# Patient Record
Sex: FEMALE | Race: OTHER | NOT HISPANIC OR LATINO | URBAN - METROPOLITAN AREA
[De-identification: names, ages, dates, MRNs, and addresses within clinical notes are randomized per-mention and may not be internally consistent; named-entity substitution may affect disease eponyms.]

---

## 2018-07-13 ENCOUNTER — EMERGENCY (EMERGENCY)
Facility: HOSPITAL | Age: 63
LOS: 1 days | Discharge: HOME | End: 2018-07-13
Attending: EMERGENCY MEDICINE | Admitting: EMERGENCY MEDICINE
Payer: COMMERCIAL

## 2018-07-13 VITALS — HEART RATE: 92 BPM | RESPIRATION RATE: 16 BRPM

## 2018-07-13 VITALS
RESPIRATION RATE: 16 BRPM | SYSTOLIC BLOOD PRESSURE: 83 MMHG | HEART RATE: 134 BPM | OXYGEN SATURATION: 100 % | DIASTOLIC BLOOD PRESSURE: 54 MMHG

## 2018-07-13 DIAGNOSIS — Z91.018 ALLERGY TO OTHER FOODS: ICD-10-CM

## 2018-07-13 DIAGNOSIS — I95.9 HYPOTENSION, UNSPECIFIED: ICD-10-CM

## 2018-07-13 DIAGNOSIS — I46.9 CARDIAC ARREST, CAUSE UNSPECIFIED: ICD-10-CM

## 2018-07-13 DIAGNOSIS — R41.82 ALTERED MENTAL STATUS, UNSPECIFIED: ICD-10-CM

## 2018-07-13 DIAGNOSIS — E78.5 HYPERLIPIDEMIA, UNSPECIFIED: ICD-10-CM

## 2018-07-13 LAB
ALBUMIN SERPL ELPH-MCNC: 1.2 G/DL — LOW (ref 3.4–5)
ALP SERPL-CCNC: 63 U/L — SIGNIFICANT CHANGE UP (ref 40–120)
ALT FLD-CCNC: >1000 U/L — HIGH (ref 12–42)
ANION GAP SERPL CALC-SCNC: 23 MMOL/L — HIGH (ref 9–16)
APTT BLD: 56.3 SEC — HIGH (ref 27.5–36.5)
AST SERPL-CCNC: >1000 U/L — HIGH (ref 15–37)
BILIRUB SERPL-MCNC: 0.6 MG/DL — SIGNIFICANT CHANGE UP (ref 0.2–1.2)
BUN SERPL-MCNC: 31 MG/DL — HIGH (ref 7–23)
CALCIUM SERPL-MCNC: 9.8 MG/DL — SIGNIFICANT CHANGE UP (ref 8.5–10.5)
CHLORIDE SERPL-SCNC: 100 MMOL/L — SIGNIFICANT CHANGE UP (ref 96–108)
CO2 SERPL-SCNC: 17 MMOL/L — LOW (ref 22–31)
CREAT SERPL-MCNC: 1.38 MG/DL — HIGH (ref 0.5–1.3)
GLUCOSE SERPL-MCNC: 485 MG/DL — CRITICAL HIGH (ref 70–99)
HCT VFR BLD CALC: 30.1 % — LOW (ref 34.5–45)
HGB BLD-MCNC: 9.6 G/DL — LOW (ref 11.5–15.5)
INR BLD: 1.64 — HIGH (ref 0.88–1.16)
LACTATE SERPL-SCNC: >15 MMOL/L — CRITICAL HIGH (ref 0.4–2)
LYMPHOCYTES # BLD AUTO: 58 % — HIGH (ref 13–44)
MCHC RBC-ENTMCNC: 31 PG — SIGNIFICANT CHANGE UP (ref 27–34)
MCHC RBC-ENTMCNC: 31.9 G/DL — LOW (ref 32–36)
MCV RBC AUTO: 97.1 FL — SIGNIFICANT CHANGE UP (ref 80–100)
MONOCYTES NFR BLD AUTO: 1 % — LOW (ref 2–14)
NEUTROPHILS NFR BLD AUTO: 22 % — LOW (ref 43–77)
PCO2 BLDV: 71 MMHG — HIGH (ref 41–51)
PH BLDV: 6.96 — CRITICAL LOW (ref 7.32–7.43)
PLATELET # BLD AUTO: 88 K/UL — LOW (ref 150–400)
PO2 BLDV: 58 MMHG — HIGH (ref 35–40)
POTASSIUM SERPL-MCNC: 6.4 MMOL/L — CRITICAL HIGH (ref 3.5–5.3)
POTASSIUM SERPL-SCNC: 6.4 MMOL/L — CRITICAL HIGH (ref 3.5–5.3)
PROT SERPL-MCNC: 3.1 G/DL — LOW (ref 6.4–8.2)
PROTHROM AB SERPL-ACNC: 18.2 SEC — HIGH (ref 9.8–12.7)
RBC # BLD: 3.1 M/UL — LOW (ref 3.8–5.2)
RBC # FLD: 16 % — SIGNIFICANT CHANGE UP (ref 10.3–16.9)
SAO2 % BLDV: 70 % — SIGNIFICANT CHANGE UP
SODIUM SERPL-SCNC: 140 MMOL/L — SIGNIFICANT CHANGE UP (ref 132–145)
TROPONIN I SERPL-MCNC: 0.05 NG/ML — SIGNIFICANT CHANGE UP (ref 0.02–0.06)
WBC # BLD: 1.2 K/UL — LOW (ref 3.8–10.5)
WBC # FLD AUTO: 1.2 K/UL — LOW (ref 3.8–10.5)

## 2018-07-13 PROCEDURE — 99291 CRITICAL CARE FIRST HOUR: CPT | Mod: 25

## 2018-07-13 PROCEDURE — 71045 X-RAY EXAM CHEST 1 VIEW: CPT | Mod: 26

## 2018-07-13 PROCEDURE — 36556 INSERT NON-TUNNEL CV CATH: CPT

## 2018-07-13 PROCEDURE — 70450 CT HEAD/BRAIN W/O DYE: CPT | Mod: 26

## 2018-07-13 PROCEDURE — 31500 INSERT EMERGENCY AIRWAY: CPT

## 2018-07-13 RX ORDER — NITROFURANTOIN MACROCRYSTAL 50 MG
0 CAPSULE ORAL
Qty: 0 | Refills: 0 | COMMUNITY

## 2018-07-13 RX ORDER — LETROZOLE 2.5 MG/1
1 TABLET, FILM COATED ORAL
Qty: 0 | Refills: 0 | COMMUNITY

## 2018-07-13 RX ORDER — PIPERACILLIN AND TAZOBACTAM 4; .5 G/20ML; G/20ML
3.38 INJECTION, POWDER, LYOPHILIZED, FOR SOLUTION INTRAVENOUS ONCE
Qty: 0 | Refills: 0 | Status: COMPLETED | OUTPATIENT
Start: 2018-07-13 | End: 2018-07-13

## 2018-07-13 RX ORDER — VANCOMYCIN HCL 1 G
1000 VIAL (EA) INTRAVENOUS ONCE
Qty: 0 | Refills: 0 | Status: COMPLETED | OUTPATIENT
Start: 2018-07-13 | End: 2018-07-13

## 2018-07-13 RX ORDER — SODIUM BICARBONATE 1 MEQ/ML
50 SYRINGE (ML) INTRAVENOUS ONCE
Qty: 0 | Refills: 0 | Status: COMPLETED | OUTPATIENT
Start: 2018-07-13 | End: 2018-07-13

## 2018-07-13 RX ORDER — RIBOCICLIB 200 MG/1
0 TABLET, FILM COATED ORAL
Qty: 0 | Refills: 0 | COMMUNITY

## 2018-07-13 RX ORDER — SODIUM CHLORIDE 9 MG/ML
3000 INJECTION INTRAMUSCULAR; INTRAVENOUS; SUBCUTANEOUS ONCE
Qty: 0 | Refills: 0 | Status: DISCONTINUED | OUTPATIENT
Start: 2018-07-13 | End: 2018-07-17

## 2018-07-13 RX ORDER — ATORVASTATIN CALCIUM 80 MG/1
1 TABLET, FILM COATED ORAL
Qty: 0 | Refills: 0 | COMMUNITY

## 2018-07-13 RX ORDER — NOREPINEPHRINE BITARTRATE/D5W 8 MG/250ML
0.1 PLASTIC BAG, INJECTION (ML) INTRAVENOUS
Qty: 8 | Refills: 0 | Status: DISCONTINUED | OUTPATIENT
Start: 2018-07-13 | End: 2018-07-17

## 2018-07-13 RX ADMIN — Medication 50 MILLIEQUIVALENT(S): at 21:33

## 2018-07-13 RX ADMIN — Medication 50 MILLIEQUIVALENT(S): at 21:28

## 2018-07-13 RX ADMIN — Medication 250 MILLIGRAM(S): at 20:37

## 2018-07-13 RX ADMIN — Medication 50 MILLIEQUIVALENT(S): at 21:25

## 2018-07-13 RX ADMIN — PIPERACILLIN AND TAZOBACTAM 200 GRAM(S): 4; .5 INJECTION, POWDER, LYOPHILIZED, FOR SOLUTION INTRAVENOUS at 20:25

## 2018-07-13 RX ADMIN — Medication 9.36 MICROGRAM(S)/KG/MIN: at 20:39

## 2018-07-13 RX ADMIN — PIPERACILLIN AND TAZOBACTAM 3.38 GRAM(S): 4; .5 INJECTION, POWDER, LYOPHILIZED, FOR SOLUTION INTRAVENOUS at 21:25

## 2018-07-13 RX ADMIN — Medication 1000 MILLIGRAM(S): at 21:57

## 2018-07-13 RX ADMIN — Medication 50 MILLIEQUIVALENT(S): at 21:30

## 2018-07-13 NOTE — ED ADULT NURSE NOTE - CHIEF COMPLAINT QUOTE
altered mental status and hypotension as per EMS, patient unresponsive on arrival, eyes open and agonal breathing

## 2018-07-13 NOTE — ED PROCEDURE NOTE - CPROC ED TRACHE INTUB DETAIL1
During intubation, applied gentle pressure to the cricoid cartilage./Patient was pre-oxygenated. An endotracheal tube (ETT) was placed through the vocal cords into the trachea.  ETT position was confirmed by auscultation of bilateral breath sounds to all lung fields. ETCO2 level was appropriate.

## 2018-07-13 NOTE — ED PROVIDER NOTE - MEDICAL DECISION MAKING DETAILS
Post-cardiac arrest.  Possible due to septic shock.  Septic w/u performed.  Abx given.  HCT perfored to r/o ICH.  Pressure support initiated.  Critical transfer to .

## 2018-07-13 NOTE — ED ADULT NURSE REASSESSMENT NOTE - NS ED NURSE REASSESS COMMENT FT1
Previous R femoral central line was removed prior to my shift and no bleeding noted at this time
Pt bleeding from old R femoral line and pressure holding at the bedside. Pt hypercritical to BI
bleeding controlled at right femoral s/p central line removal. dressing applied
strong carotid/ femoral pulses felt, no BP obtained by monitor nor manual

## 2018-07-13 NOTE — ED PROVIDER NOTE - PROGRESS NOTE DETAILS
: Dereck Wood [phone] (Cellular) [phone] (Home) : Dereck Wood [phone] (Cellular) [phone] (Home).  Discussed care.  He confirms pt is full code.  Hypercritical transfer discussed. To note pt is difficult peripheral access.  Crash line placed to R groin - stuck artery - line removed.  Pressure dressing placed.  Central line placed to L femoral vein.      To note pt bleed through pressure dressing - pressure applied by PCT.

## 2018-07-13 NOTE — ED PROVIDER NOTE - PHYSICAL EXAMINATION
VITAL SIGNS: I have reviewed nursing notes and confirm.  CONSTITUTIONAL: Thin, cachectic, unresponsive to both verbal and painful stimuli.   SKIN: Skin is warm and dry, no acute rash or trauma.   HEAD: Normocephalic; atraumatic.  EYES: PERRL, EOM intact; conjunctiva and sclera clear.  ENT: No nasal discharge; airway clear.  CARD: S1, S2 normal; +Murmur. +Tachycardia.   RESP: No wheezes, rales or rhonchi.  Intubated - equal BS bilaterally.   ABD: Normal bowel sounds; soft; non-distended; non-tender; no hepatosplenomegaly.  EXT: Thin with atrophy. No clubbing, cyanosis or edema.  NEURO: Unresponsive.

## 2018-07-13 NOTE — ED PROCEDURE NOTE - CPROC ED INFUS LINE DETAIL1
The guidewire was recovered./The catheter was placed using sterile technique./All lumen(s) aspirated and flushed without difficulty./The location was identified, and the area was draped and prepped.

## 2018-07-13 NOTE — ED PROVIDER NOTE - CRITICAL CARE PROVIDED
direct patient care (not related to procedure)/consultation with other physicians/additional history taking/documentation/interpretation of diagnostic studies/consult w/ pt's family directly relating to pts condition

## 2018-07-13 NOTE — ED ADULT NURSE NOTE - OBJECTIVE STATEMENT
as per  she has been at [facility] for 1 week and they called 911 because she was lethargic and hypotensive. as per EMS she was responsive on the ambulance and on arrival to ED she became unresponsive with shallow breathing and then agonal

## 2018-07-13 NOTE — ED PROVIDER NOTE - OBJECTIVE STATEMENT
64 y/o Female with a PMHx of hyperlipidemia, breast cancer with mets to the liver and bone BIBEMS as a notification with  for lethargy, AMS, hypotension, and tachycardia. Prior to ED arrival, pt was verbal with  and [facility] staff. Pt was not able to answer any questions or respond to any stimuli upon arrival to the ED. Her gaze was fixated to the left.  As per , pt was lethargic today and the NP at [facility] called EMS when she became hypotensive and tachycardiac. Pt was at baseline yesterday. Pt is coming from [facility] where she has been admitted for 1 week for rehab. Allergies to honeydew, cantaloupe, and melon. 62 y/o Female with a PMHx of hyperlipidemia, breast cancer with mets to the liver and bone, BIBEMS as a notification for lethargy, AMS, hypotension, and tachycardia. Accompanied by .  Prior to ED arrival, pt was verbal with  and [facility] staff. Pt was not able to answer any questions or respond to any stimuli upon arrival to the ED. Her gaze was fixated to the left.  As per , pt was lethargic today and the NP at [facility] called EMS when she became hypotensive and tachycardiac. Pt was at baseline yesterday. Pt is coming from [facility] where she has been admitted for 1 week for rehab.   notes 30 lb weight loss over past few weeks.  Decreased PO intake.  He also reports she is full code. Allergies to honeydew, cantaloupe, and melon.    Upon arrival we were unable to obtain a BP.  Pt had a pulse but was thready.  Pt ultimately lost her pulse and CPR initiated.  Pt intubated.  Multiple rounds of epi given.  Bicarb given, mag given, treated presumptively for hyperkalemia.  Pt ultimately regained pulses.  CXR performed and confirmed tube placement.  Started on levophed drip.  HCT performed to r/o acute ICH.  [facility] ICU consulted - Dr. Willingham recommends 4 amps of bicarb given lab results.  [facility] MICU currently has no bed so hypercritical transfer to  initiated.  Case discussed with Dr. Ashraf - accepted transfer.

## 2018-07-13 NOTE — ED ADULT TRIAGE NOTE - CHIEF COMPLAINT QUOTE
altered mental status and hypotension as per EMS altered mental status and hypotension as per EMS, patient unresponsive on arrival, eyes open and agonal breathing

## 2018-07-14 LAB
-  STREPTOCOCCUS SP. (NOT GRP A, B OR S PNEUMONIAE): SIGNIFICANT CHANGE UP
GRAM STN FLD: SIGNIFICANT CHANGE UP
METHOD TYPE: SIGNIFICANT CHANGE UP
SPECIMEN SOURCE: SIGNIFICANT CHANGE UP
SPECIMEN SOURCE: SIGNIFICANT CHANGE UP

## 2018-07-17 LAB
-  AMOXICILLIN/CLAVULANIC ACID: SIGNIFICANT CHANGE UP
-  AMOXICILLIN/CLAVULANIC ACID: SIGNIFICANT CHANGE UP
-  AMPICILLIN/SULBACTAM: SIGNIFICANT CHANGE UP
-  AMPICILLIN/SULBACTAM: SIGNIFICANT CHANGE UP
-  AMPICILLIN: SIGNIFICANT CHANGE UP
-  CEFAZOLIN: SIGNIFICANT CHANGE UP
-  CEFAZOLIN: SIGNIFICANT CHANGE UP
-  CEFTRIAXONE: 0.05 — SIGNIFICANT CHANGE UP
-  CEFTRIAXONE: 0.06 — SIGNIFICANT CHANGE UP
-  CEFTRIAXONE: SIGNIFICANT CHANGE UP
-  CEFTRIAXONE: SIGNIFICANT CHANGE UP
-  CIPROFLOXACIN: SIGNIFICANT CHANGE UP
-  CIPROFLOXACIN: SIGNIFICANT CHANGE UP
-  CLINDAMYCIN: SIGNIFICANT CHANGE UP
-  DAPTOMYCIN: SIGNIFICANT CHANGE UP
-  DAPTOMYCIN: SIGNIFICANT CHANGE UP
-  ERYTHROMYCIN: SIGNIFICANT CHANGE UP
-  GENTAMICIN: SIGNIFICANT CHANGE UP
-  GENTAMICIN: SIGNIFICANT CHANGE UP
-  LEVOFLOXACIN: SIGNIFICANT CHANGE UP
-  LEVOFLOXACIN: SIGNIFICANT CHANGE UP
-  LINEZOLID: SIGNIFICANT CHANGE UP
-  LINEZOLID: SIGNIFICANT CHANGE UP
-  MEROPENEM: SIGNIFICANT CHANGE UP
-  MEROPENEM: SIGNIFICANT CHANGE UP
-  MOXIFLOXACIN(AEROBIC): SIGNIFICANT CHANGE UP
-  MOXIFLOXACIN(AEROBIC): SIGNIFICANT CHANGE UP
-  OXACILLIN: SIGNIFICANT CHANGE UP
-  OXACILLIN: SIGNIFICANT CHANGE UP
-  PENICILLIN: SIGNIFICANT CHANGE UP
-  RIFAMPIN: SIGNIFICANT CHANGE UP
-  RIFAMPIN: SIGNIFICANT CHANGE UP
-  TETRACYCLINE: SIGNIFICANT CHANGE UP
-  TETRACYCLINE: SIGNIFICANT CHANGE UP
-  TRIMETHOPRIM/SULFAMETHOXAZOLE: SIGNIFICANT CHANGE UP
-  TRIMETHOPRIM/SULFAMETHOXAZOLE: SIGNIFICANT CHANGE UP
-  VANCOMYCIN: SIGNIFICANT CHANGE UP
CULTURE RESULTS: SIGNIFICANT CHANGE UP
METHOD TYPE: SIGNIFICANT CHANGE UP
SPECIMEN SOURCE: SIGNIFICANT CHANGE UP

## 2018-07-18 LAB
CULTURE RESULTS: SIGNIFICANT CHANGE UP
ORGANISM # SPEC MICROSCOPIC CNT: SIGNIFICANT CHANGE UP
SPECIMEN SOURCE: SIGNIFICANT CHANGE UP

## 2021-08-13 NOTE — ED PROCEDURE NOTE - NS ED ATTENDING STATEMENT MOD
Attending Only
Attending Only
Vital Signs Last 24 Hrs  T(C): 36.6 (13 Aug 2021 03:15), Max: 39.1 (12 Aug 2021 13:34)  T(F): 97.8 (13 Aug 2021 03:15), Max: 102.4 (12 Aug 2021 13:34)  HR: 83 (13 Aug 2021 04:48) (74 - 99)  BP: 145/79 (13 Aug 2021 03:15) (110/84 - 145/79)  BP(mean): 92 (13 Aug 2021 02:00) (90 - 101)  RR: 25 (13 Aug 2021 04:48) (18 - 41)  SpO2: 93% (13 Aug 2021 04:48) (87% - 95%)